# Patient Record
Sex: MALE | Race: WHITE | NOT HISPANIC OR LATINO | URBAN - NONMETROPOLITAN AREA
[De-identification: names, ages, dates, MRNs, and addresses within clinical notes are randomized per-mention and may not be internally consistent; named-entity substitution may affect disease eponyms.]

---

## 2018-11-02 NOTE — PATIENT DISCUSSION
Patient understands condition, prognosis and need for follow up care.

## 2019-12-03 ENCOUNTER — IMPORTED ENCOUNTER (OUTPATIENT)
Dept: URBAN - NONMETROPOLITAN AREA CLINIC 1 | Facility: CLINIC | Age: 74
End: 2019-12-03

## 2019-12-03 PROBLEM — H53.022: Noted: 2019-12-03

## 2019-12-03 PROBLEM — H25.13: Noted: 2019-12-03

## 2019-12-03 PROCEDURE — 92004 COMPRE OPH EXAM NEW PT 1/>: CPT

## 2019-12-03 NOTE — PATIENT DISCUSSION
Cataract OU-Not yet surgical. -Reviewed symptoms of advancing cataract growth such as glare and halos and decreased vision.-Continue to monitor for now. Pt will notify us if any new symptoms develop. amblyopia osmonitor

## 2020-06-24 NOTE — PATIENT DISCUSSION
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 Will follow closely but doing well. Candidate for i-stent.

## 2020-12-02 NOTE — PATIENT DISCUSSION
The types of intraocular lenses were reviewed with the patient along with a discussion of their various strengths and weaknesses. Unresponsive

## 2021-01-08 NOTE — PATIENT DISCUSSION
Verified Results  (1) CBC/PLT/DIFF [id number] 07:25AM Boyd Drummond Order Number: [id number]     Test Name Result Flag Reference   WBC COUNT 6 60 Thousand/uL  4 80-10 80   WBC ADJUSTED 6 60 Thousand/uL  4 80-10 80   RBC COUNT 4 49 Million/uL L 4 70-6 10   HEMOGLOBIN 14 6 g/dL  14 0-18 0   HEMATOCRIT 41 5 % L 42 0-52 0   MCV 93 fL  82-98   MCH 32 6 pg H 27 0-31 0   MCHC 35 2 g/dL  31 4-37 4   RDW 13 5 %  11 6-15 1   MPV 8 1 fL L 8 9-12 7   PLATELET COUNT 451 Thousands/uL  130-400   NEUTROPHILS RELATIVE PERCENT 61 %  43-75   LYMPHOCYTES RELATIVE PERCENT 25 %  14-44   MONOCYTES RELATIVE PERCENT 9 %  4-12   EOSINOPHILS RELATIVE PERCENT 5 %  0-6   BASOPHILS RELATIVE PERCENT 1 %  0-1   NEUTROPHILS ABSOLUTE COUNT 4 00 Thousands/?L  1 85-7 62   LYMPHOCYTES ABSOLUTE COUNT 1 70 Thousands/?L  0 60-4 47   MONOCYTES ABSOLUTE COUNT 0 60 Thousand/?L  0 17-1 22   EOSINOPHILS ABSOLUTE COUNT 0 30 Thousand/?L  0 00-0 61   BASOPHILS ABSOLUTE COUNT 0 00 Thousands/?L  0 00-0 10   - Patient Instructions: This bloodwork is non-fasting  Please drink two glasses of water morning of bloodwork     WBC COUNT 6 60 Thousand/uL  4 80-10 80   WBC ADJUSTED 6 60 Thousand/uL  4 80-10 80   RBC COUNT 4 49 Million/uL L 4 70-6 10   HEMOGLOBIN 14 6 g/dL  14 0-18 0   HEMATOCRIT 41 5 % L 42 0-52 0   MCV 93 fL  82-98   MCH 32 6 pg H 27 0-31 0   MCHC 35 2 g/dL  31 4-37 4   RDW 13 5 %  11 6-15 1   MPV 8 1 fL L 8 9-12 7   PLATELET COUNT 215 Thousands/uL  130-400   NEUTROPHILS RELATIVE PERCENT 61 %  43-75   LYMPHOCYTES RELATIVE PERCENT 25 %  14-44   MONOCYTES RELATIVE PERCENT 9 %  4-12   EOSINOPHILS RELATIVE PERCENT 5 %  0-6   BASOPHILS RELATIVE PERCENT 1 %  0-1   NEUTROPHILS ABSOLUTE COUNT 4 00 Thousands/?L  1 85-7 62   LYMPHOCYTES ABSOLUTE COUNT 1 70 Thousands/?L  0 60-4 47   MONOCYTES ABSOLUTE COUNT 0 60 Thousand/?L  0 17-1 22   EOSINOPHILS ABSOLUTE COUNT 0 30 Thousand/?L  0 00-0 61   BASOPHILS ABSOLUTE COUNT 0 00 Thousands/?L  0 00-0 10   - resolved today. Patient Instructions: This bloodwork is non-fasting  Please drink two glasses of water morning of bloodwork  (1) COMPREHENSIVE METABOLIC PANEL [id number] 99:68OK Mark Stevenson Order Number: [id number]     Test Name Result Flag Reference   GLUCOSE,RANDM 95 mg/dL     If the patient is fasting, the ADA then defines impaired fasting glucose as > 100 mg/dL and diabetes as > or equal to 123 mg/dL  SODIUM 141 mmol/L  136-145   POTASSIUM 4 1 mmol/L  3 5-5 3   CHLORIDE 104 mmol/L  100-108   CARBON DIOXIDE 32 mmol/L  21-32   ANION GAP (CALC) 5 mmol/L  4-13   BLOOD UREA NITROGEN 9 mg/dL  5-25   CREATININE 0 98 mg/dL  0 60-1 30   Standardized to IDMS reference method   CALCIUM 8 4 mg/dL  8 3-10 1   BILI, TOTAL 0 40 mg/dL  0 20-1 00   ALK PHOSPHATAS 60 U/L     ALT (SGPT) 38 U/L  12-78   AST(SGOT) 16 U/L  5-45   ALBUMIN 3 7 g/dL  3 5-5 0   TOTAL PROTEIN 6 8 g/dL  6 4-8 2   eGFR Non-African American      >60 0 ml/min/1 73sq [facility] Disease Education Program recommendations are as follows:  GFR calculation is accurate only with a steady state creatinine  Chronic Kidney disease less than 60 ml/min/1 73 sq  meters  Kidney failure less than 15 ml/min/1 73 sq  meters  GLUCOSE,RANDM 95 mg/dL     If the patient is fasting, the ADA then defines impaired fasting glucose as > 100 mg/dL and diabetes as > or equal to 123 mg/dL     SODIUM 141 mmol/L  136-145   POTASSIUM 4 1 mmol/L  3 5-5 3   CHLORIDE 104 mmol/L  100-108   CARBON DIOXIDE 32 mmol/L  21-32   ANION GAP (CALC) 5 mmol/L  4-13   BLOOD UREA NITROGEN 9 mg/dL  5-25   CREATININE 0 98 mg/dL  0 60-1 30   Standardized to IDMS reference method   CALCIUM 8 4 mg/dL  8 3-10 1   BILI, TOTAL 0 40 mg/dL  0 20-1 00   ALK PHOSPHATAS 60 U/L     ALT (SGPT) 38 U/L  12-78   AST(SGOT) 16 U/L  5-45   ALBUMIN 3 7 g/dL  3 5-5 0   TOTAL PROTEIN 6 8 g/dL  6 4-8 2   eGFR Non-African American      >60 0 ml/min/1 73sq [place]   [employer] Disease Education Program recommendations are as follows:  GFR calculation is accurate only with a steady state creatinine  Chronic Kidney disease less than 60 ml/min/1 73 sq  meters  Kidney failure less than 15 ml/min/1 73 sq  meters  [address] [place] [address] [id number] Stacie Schumacher Order Number: [id number]    - Patient Instructions: To schedule this appointment, please contact Central Scheduling at [phone]   Order Number: [id number]    - Patient Instructions: To schedule this appointment, please contact Central Scheduling at [phone]  Test Name Result Flag Reference   US ABDOMEN LIMITED (Report)     RIGHT UPPER QUADRANT ULTRASOUND     INDICATION: Abdominal tenderness     COMPARISON: 12/15/2009     TECHNIQUE:  Real-time ultrasound of the right upper quadrant was performed with a curvilinear transducer with both volumetric sweeps and still imaging techniques  FINDINGS:     PANCREAS: The good portion of the pancreas is obscured by overlying bowel gas  AORTA AND IVC: Visualized portions are normal for patient age  LIVER:   Size: Within normal range  The liver measures 16 2 cm in the midclavicular line  Contour: Surface contour is smooth  Parenchyma: Mild fatty change  No evidence of suspicious mass  The main portal vein is patent and hepatopetal       BILIARY:   The gallbladder is normal in caliber  No wall thickening or pericholecystic fluid  No stones or sludge identified  No sonographic Tamez's sign  No intrahepatic biliary dilatation  CBD measures 3 mm  No choledocholithiasis  KIDNEY:    Right kidney measures 12 2 x 5 1 cm  The renal cortex measures 1 3 cm  Within normal limits  ASCITES:  None  IMPRESSION:     Mild fatty change in the liver  No gallstones         Workstation performed: [id number]     Signed by:   Mariama Ly MD   9/21/16

## 2022-04-09 ASSESSMENT — VISUAL ACUITY
OD_CC: J1
OS_CC: 20/80
OD_CC: 20/30
OS_CC: J1

## 2022-04-09 ASSESSMENT — TONOMETRY
OD_IOP_MMHG: 15
OS_IOP_MMHG: 15

## 2022-07-18 NOTE — PATIENT DISCUSSION
VF APPEARS TO BE STABLE AND IOP IS MUCH BETTER ON TIMOLOL BID OU. GOOD IMPROVEMENT TODAY IN CONTROL.

## 2024-04-16 ENCOUNTER — NEW PATIENT (OUTPATIENT)
Dept: RURAL CLINIC 1 | Facility: CLINIC | Age: 79
End: 2024-04-16

## 2024-04-16 DIAGNOSIS — H53.022: ICD-10-CM

## 2024-04-16 DIAGNOSIS — H25.13: ICD-10-CM

## 2024-04-16 PROCEDURE — 92004 COMPRE OPH EXAM NEW PT 1/>: CPT

## 2024-04-16 PROCEDURE — 92015 DETERMINE REFRACTIVE STATE: CPT

## 2024-04-16 ASSESSMENT — TONOMETRY
OD_IOP_MMHG: 15
OS_IOP_MMHG: 15

## 2024-04-16 ASSESSMENT — VISUAL ACUITY
OD_SC: 20/30
OU_CC: 20/25
OS_SC: 20/70